# Patient Record
Sex: MALE | Race: WHITE
[De-identification: names, ages, dates, MRNs, and addresses within clinical notes are randomized per-mention and may not be internally consistent; named-entity substitution may affect disease eponyms.]

---

## 2022-08-10 ENCOUNTER — NURSE TRIAGE (OUTPATIENT)
Dept: OTHER | Facility: CLINIC | Age: 47
End: 2022-08-10

## 2022-08-10 NOTE — TELEPHONE ENCOUNTER
Received call from Darion Naranjo at [facility] with [employer]. Subjective: Caller states \"I am feeling lightheaded\"     Current Symptoms: Legs are shaky. SOB at time of call. BP last evening was 150/110. Unable to take BP at time of call. Headache in his temples. Mind seems foggy at times. Blurry vision. 2 weeks ago had chest pain and L arm pain. Not on BP meds. Dx as borderline diabetic in 10 years       Onset: 1 day ago; unchanged    Associated Symptoms: reduced activity    Pain Severity: 3/10; throbbing; constant, mild    Temperature: denies fever     What has been tried: Advil    LMP: NA Pregnant: NA    Recommended disposition: Go to ED/[facility] Now (Or to Office with PCP Approval)    Care advice provided, patient verbalizes understanding; denies any other questions or concerns; instructed to call back for any new or worsening symptoms. Patient/caller agrees to proceed to nearest [facility]      Attention Provider: Thank you for allowing me to participate in the care of your patient. The patient was connected to triage in response to information provided to the ECC/PSC. Please do not respond through this encounter as the response is not directed to a shared pool.     Reason for Disposition   Systolic BP >= 145 OR Diastolic >= 084, and any cardiac or neurologic symptoms (e.g., chest pain, difficulty breathing, unsteady gait, blurred vision)    Protocols used: Blood Pressure - High-ADULT-[place]